# Patient Record
Sex: MALE | Race: WHITE | NOT HISPANIC OR LATINO | Employment: UNEMPLOYED | URBAN - METROPOLITAN AREA
[De-identification: names, ages, dates, MRNs, and addresses within clinical notes are randomized per-mention and may not be internally consistent; named-entity substitution may affect disease eponyms.]

---

## 2017-08-26 ENCOUNTER — HOSPITAL ENCOUNTER (EMERGENCY)
Facility: OTHER | Age: 22
Discharge: HOME | End: 2017-08-27
Attending: EMERGENCY MEDICINE

## 2017-08-26 DIAGNOSIS — L02.214 ABSCESS OF GROIN: Primary | ICD-10-CM

## 2017-08-26 PROCEDURE — 99283 EMERGENCY DEPT VISIT LOW MDM: CPT | Mod: 25

## 2017-08-26 PROCEDURE — 10060 I&D ABSCESS SIMPLE/SINGLE: CPT

## 2017-08-27 VITALS
HEIGHT: 66 IN | DIASTOLIC BLOOD PRESSURE: 57 MMHG | WEIGHT: 157.63 LBS | BODY MASS INDEX: 25.33 KG/M2 | SYSTOLIC BLOOD PRESSURE: 111 MMHG | HEART RATE: 84 BPM | TEMPERATURE: 99 F | OXYGEN SATURATION: 99 % | RESPIRATION RATE: 18 BRPM

## 2017-08-27 PROCEDURE — 87186 SC STD MICRODIL/AGAR DIL: CPT

## 2017-08-27 PROCEDURE — 87070 CULTURE OTHR SPECIMN AEROBIC: CPT

## 2017-08-27 PROCEDURE — 25000003 PHARM REV CODE 250: Performed by: EMERGENCY MEDICINE

## 2017-08-27 PROCEDURE — 87077 CULTURE AEROBIC IDENTIFY: CPT

## 2017-08-27 RX ORDER — HYDROCODONE BITARTRATE AND ACETAMINOPHEN 5; 325 MG/1; MG/1
1 TABLET ORAL
Status: COMPLETED | OUTPATIENT
Start: 2017-08-27 | End: 2017-08-27

## 2017-08-27 RX ORDER — LIDOCAINE HYDROCHLORIDE 10 MG/ML
5 INJECTION INFILTRATION; PERINEURAL
Status: DISCONTINUED | OUTPATIENT
Start: 2017-08-27 | End: 2017-08-27 | Stop reason: HOSPADM

## 2017-08-27 RX ORDER — ACETAMINOPHEN 500 MG
1000 TABLET ORAL
Status: COMPLETED | OUTPATIENT
Start: 2017-08-27 | End: 2017-08-27

## 2017-08-27 RX ORDER — IBUPROFEN 600 MG/1
600 TABLET ORAL EVERY 8 HOURS PRN
Qty: 15 TABLET | Refills: 0 | OUTPATIENT
Start: 2017-08-27 | End: 2019-07-05

## 2017-08-27 RX ORDER — ONDANSETRON 4 MG/1
4 TABLET, ORALLY DISINTEGRATING ORAL
Status: COMPLETED | OUTPATIENT
Start: 2017-08-27 | End: 2017-08-27

## 2017-08-27 RX ORDER — CLINDAMYCIN HYDROCHLORIDE 150 MG/1
300 CAPSULE ORAL
Status: COMPLETED | OUTPATIENT
Start: 2017-08-27 | End: 2017-08-27

## 2017-08-27 RX ORDER — SULFAMETHOXAZOLE AND TRIMETHOPRIM 800; 160 MG/1; MG/1
1 TABLET ORAL 2 TIMES DAILY
Qty: 14 TABLET | Refills: 0 | Status: SHIPPED | OUTPATIENT
Start: 2017-08-27 | End: 2017-09-03

## 2017-08-27 RX ADMIN — CLINDAMYCIN HYDROCHLORIDE 300 MG: 150 CAPSULE ORAL at 02:08

## 2017-08-27 RX ADMIN — ACETAMINOPHEN 1000 MG: 500 TABLET ORAL at 12:08

## 2017-08-27 RX ADMIN — ONDANSETRON 4 MG: 4 TABLET, ORALLY DISINTEGRATING ORAL at 12:08

## 2017-08-27 RX ADMIN — HYDROCODONE BITARTRATE AND ACETAMINOPHEN 1 TABLET: 5; 325 TABLET ORAL at 02:08

## 2017-08-29 LAB — BACTERIA SPEC AEROBE CULT: NORMAL

## 2017-08-29 NOTE — ED PROVIDER NOTES
Encounter Date: 8/26/2017       History     Chief Complaint   Patient presents with    Abscess     Patient states he has an abscess located in his pubic area.  He states it is red and swollen.  It appeared Friday.       Mr Knott states he picked an ingrown hair in his pubic region several days ago and it got red and swollen then suddenly got more swollen over the past day. He reports some mild chills but otherwise feels ok. Reports moderate pain in groin area of swelling and redness.  Denies history of same in the past.  He denies penile or scrotal pain or swelling.  He denies vomiting, diarrhea or constipation, or abdominal pain.  No home medications tried.      The history is provided by the patient.     Review of patient's allergies indicates:  No Known Allergies  History reviewed. No pertinent past medical history.  History reviewed. No pertinent surgical history.  History reviewed. No pertinent family history.  Social History   Substance Use Topics    Smoking status: Current Every Day Smoker    Smokeless tobacco: Never Used    Alcohol use Yes      Comment: occassionally     Review of Systems   Constitutional: Positive for chills.   Respiratory: Negative.    Cardiovascular: Negative.    Gastrointestinal: Negative.    Musculoskeletal: Negative.    Skin:        Positive redness, swelling, and pain to groin over pubic hair   All other systems reviewed and are negative.      Physical Exam     Initial Vitals [08/26/17 2346]   BP Pulse Resp Temp SpO2   133/82 (!) 129 20 (!) 100.6 °F (38.1 °C) 97 %      MAP       99         Physical Exam    Nursing note and vitals reviewed.  Constitutional: He appears well-developed and well-nourished. He is not diaphoretic. No distress.   HENT:   Head: Normocephalic and atraumatic.   Eyes: EOM are normal. Pupils are equal, round, and reactive to light.   Cardiovascular: Normal rate and regular rhythm.   No murmur heard.  Pulmonary/Chest: Breath sounds normal. No respiratory  "distress.   Musculoskeletal: Normal range of motion. He exhibits no edema or tenderness.   Neurological: He is alert and oriented to person, place, and time. No sensory deficit.   Skin: Skin is warm and dry. Capillary refill takes less than 2 seconds.   Palm-sized area of swelling, erythema, and tenderness to palpation over the right anterior groin region overlying pubic care..  Well-circumscribed area of clinical cellulitis with central very mild fluctuance and moderate surrounding induration.    Psychiatric: He has a normal mood and affect. His behavior is normal. Thought content normal.         ED Course   I & D - Incision and Drainage  Date/Time: 8/29/2017 12:21 PM  Location procedure was performed: [facility] EMERGENCY DEPARTMENT  Performed by: ENRIKE GRIDER  Authorized by: ENRIKE GRIDER   Consent Done: Yes  Consent: Verbal consent obtained. Written consent not obtained.  Risks and benefits: risks, benefits and alternatives were discussed  Consent given by: patient  Patient understanding: patient states understanding of the procedure being performed  Required items: required blood products, implants, devices, and special equipment available  Patient identity confirmed: verbally with patient  Time out: Immediately prior to procedure a "time out" was called to verify the correct patient, procedure, equipment, support staff and site/side marked as required.  Type: abscess  Anesthesia: local infiltration    Anesthesia:  Local Anesthetic: lidocaine 1% without epinephrine  Anesthetic total: 2 mL  Patient sedated: no  Description of findings: mild seropurulent material   Scalpel size: 10  Incision type: single straight  Complexity: simple  Drainage: serous and  pus  Drainage amount: scant  Wound treatment: wound packed (Irrigated with normal saline)  Packing material: 1/4 in iodoform gauze  Complications: No  Specimens: Yes  Patient tolerance: Patient tolerated the procedure well with no immediate " complications        Labs Reviewed   CULTURE, AEROBIC  (SPECIFY SOURCE)             Medical Decision Making:   ED Management:  Mr. Knott was counseled on home care and need to return for follow-up and recheck in 2 days.  He is stable for discharge.  We discussed worrisome signs that should prompt immediate return to the nurse [place].  There is no indication for further emergent intervention or evaluation this time.                   ED Course     Clinical Impression:   The encounter diagnosis was Abscess of groin.                           Cee Arciniega MD  08/29/17 6421

## 2019-07-05 ENCOUNTER — HOSPITAL ENCOUNTER (EMERGENCY)
Facility: HOSPITAL | Age: 24
Discharge: HOME | End: 2019-07-05
Attending: EMERGENCY MEDICINE

## 2019-07-05 VITALS
HEIGHT: 67 IN | WEIGHT: 165 LBS | HEART RATE: 83 BPM | TEMPERATURE: 99 F | SYSTOLIC BLOOD PRESSURE: 133 MMHG | BODY MASS INDEX: 25.9 KG/M2 | DIASTOLIC BLOOD PRESSURE: 64 MMHG | OXYGEN SATURATION: 98 %

## 2019-07-05 DIAGNOSIS — L24.9 IRRITANT CONTACT DERMATITIS, UNSPECIFIED TRIGGER: Primary | ICD-10-CM

## 2019-07-05 PROCEDURE — 99283 EMERGENCY DEPT VISIT LOW MDM: CPT | Mod: ER

## 2019-07-05 PROCEDURE — 25000003 PHARM REV CODE 250: Mod: ER | Performed by: NURSE PRACTITIONER

## 2019-07-05 RX ORDER — MUPIROCIN 20 MG/G
OINTMENT TOPICAL
Status: COMPLETED | OUTPATIENT
Start: 2019-07-05 | End: 2019-07-05

## 2019-07-05 RX ORDER — SULFAMETHOXAZOLE AND TRIMETHOPRIM 800; 160 MG/1; MG/1
1 TABLET ORAL 2 TIMES DAILY
Qty: 14 TABLET | Refills: 0 | Status: SHIPPED | OUTPATIENT
Start: 2019-07-05 | End: 2019-07-12

## 2019-07-05 RX ADMIN — MUPIROCIN: 20 OINTMENT TOPICAL at 06:07

## 2019-10-27 PROCEDURE — 99284 EMERGENCY DEPT VISIT MOD MDM: CPT

## 2019-10-28 ENCOUNTER — HOSPITAL ENCOUNTER (EMERGENCY)
Facility: HOSPITAL | Age: 24
Discharge: HOME | End: 2019-10-28
Attending: EMERGENCY MEDICINE

## 2019-10-28 VITALS
HEART RATE: 75 BPM | TEMPERATURE: 98 F | RESPIRATION RATE: 16 BRPM | DIASTOLIC BLOOD PRESSURE: 61 MMHG | HEIGHT: 66 IN | WEIGHT: 160 LBS | BODY MASS INDEX: 25.71 KG/M2 | SYSTOLIC BLOOD PRESSURE: 111 MMHG | OXYGEN SATURATION: 95 %

## 2019-10-28 DIAGNOSIS — L03.90 CELLULITIS, UNSPECIFIED CELLULITIS SITE: Primary | ICD-10-CM

## 2019-10-28 RX ORDER — MUPIROCIN 20 MG/G
OINTMENT TOPICAL 2 TIMES DAILY
Qty: 15 G | Refills: 0 | Status: SHIPPED | OUTPATIENT
Start: 2019-10-28

## 2019-10-28 RX ORDER — SULFAMETHOXAZOLE AND TRIMETHOPRIM 800; 160 MG/1; MG/1
2 TABLET ORAL 2 TIMES DAILY
Qty: 28 TABLET | Refills: 0 | Status: SHIPPED | OUTPATIENT
Start: 2019-10-28 | End: 2019-11-04

## 2019-10-28 RX ORDER — PANTOPRAZOLE SODIUM 20 MG/1
20 TABLET, DELAYED RELEASE ORAL DAILY
Qty: 14 TABLET | Refills: 0 | Status: SHIPPED | OUTPATIENT
Start: 2019-10-28 | End: 2019-11-11

## 2019-10-28 NOTE — ED TRIAGE NOTES
Pt presents via personal transport for evaluation of possible insect bites. He reports one on his chest and one on his back.

## 2019-10-28 NOTE — DISCHARGE INSTRUCTIONS
Take all antibiotics as prescribed.  Try to take with meals to limit nausea.  Protonix daily to help with heartburn.  Continue with warm compresses.    Follow-up with and establish care with a primary care provider for re-evaluation.  Please return to this emergency department if symptoms persist or worsen, if you begin with fever, if swelling and redness worsen, if any other problems occur.

## 2019-10-28 NOTE — ED PROVIDER NOTES
Encounter Date: 10/27/2019       History     Chief Complaint   Patient presents with    Insect Bite     Patient reports bug bite on chest and back. Patient states bite on chest started off as a mosquito bite. The bite on his back started off as a pimple. Patient states over the last few days the sites are painful to touch. No discharge noted at sites.      25yo M with no significant pmh on file with chief complaint possible insect bite. Pt states 2-3 day hx pimple-like mass to chest and low back. Denies hx insect bite. Denies fever. No hx frequent skin infections. Pt states areas are exquisitely ttp. No drainage. No IV drug use. No chills. No night sweats or unintentional weight loss. No trauma. No hx immunosuppression. Symptoms acute, constant, severity 6/10. No alleviating factors.         Review of patient's allergies indicates:  No Known Allergies  No past medical history on file.  No past surgical history on file.  History reviewed. No pertinent family history.  Social History     Tobacco Use    Smoking status: Current Every Day Smoker    Smokeless tobacco: Never Used   Substance Use Topics    Alcohol use: Yes     Comment: occassionally    Drug use: Yes     Types: Marijuana     Comment: weekly     Review of Systems   Constitutional: Negative for chills and fever.   HENT: Negative for congestion, rhinorrhea and sore throat.    Respiratory: Negative for shortness of breath.    Cardiovascular: Negative for chest pain.   Gastrointestinal: Negative for abdominal pain, nausea and vomiting.   Genitourinary: Negative for dysuria.   Musculoskeletal: Negative for back pain, myalgias, neck pain and neck stiffness.   Skin: Positive for wound. Negative for rash.   Neurological: Negative for weakness.   Hematological: Does not bruise/bleed easily.   All other systems reviewed and are negative.      Physical Exam     Initial Vitals [10/27/19 2221]   BP Pulse Resp Temp SpO2   123/65 97 17 98.3 °F (36.8 °C) 98 %      MAP        --         Physical Exam    Nursing note and vitals reviewed.  Constitutional: He appears well-developed and well-nourished. He is not diaphoretic. No distress.   HENT:   Head: Normocephalic and atraumatic.   Eyes: Conjunctivae and EOM are normal. Pupils are equal, round, and reactive to light.   Neck: Normal range of motion. Neck supple.   Cardiovascular: Intact distal pulses.   Pulmonary/Chest: No respiratory distress.   Abdominal: There is no tenderness.   Musculoskeletal: Normal range of motion. He exhibits no tenderness.   Neurological: He is alert and oriented to person, place, and time. He has normal strength.   Skin: Skin is warm and dry. Capillary refill takes less than 2 seconds. No rash and no abscess noted. No erythema.   Indurated mass to abdomen, just below xyphoid; exquisitely ttp; similar mass to R low back   Psychiatric: He has a normal mood and affect. His behavior is normal. Judgment and thought content normal.         ED Course   Procedures  Labs Reviewed - No data to display       Imaging Results    None          Medical Decision Making:   Differential Diagnosis:   Abscess, infected wound, cellulitis  ED Management:  Will trial warm compresses, topical and systemic abx. No significant comorbidities. No fever. Vitals reassuring. Return precautions given.                       Clinical Impression:       ICD-10-CM ICD-9-CM   1. Cellulitis, unspecified cellulitis site L03.90 682.9         Disposition:   Disposition: Discharged  Condition: Stable                        Rudolph Longoria PA-C  10/28/19 0414

## 2020-10-27 NOTE — ED PROVIDER NOTES
Encounter Date: 7/5/2019       History     Chief Complaint   Patient presents with    Rash     rash on lower abdoman has been there for over a month.    draining fluid.      23 y/o male presents to the ED with complaints of rash to lower abdomen for 1 month.  He denies fever, abdominal pain, nausea, vomiting, diarrhea, numbness, weakness, tingling, CP, SOB, new soaps/lotions/detergents, URI symptoms, or any other complaints.  He denies pain and states the rash is itching and burning.  He has not tried anything for his symptoms.        Review of patient's allergies indicates:  No Known Allergies  History reviewed. No pertinent past medical history.  History reviewed. No pertinent surgical history.  No family history on file.  Social History     Tobacco Use    Smoking status: Current Every Day Smoker    Smokeless tobacco: Never Used   Substance Use Topics    Alcohol use: Yes     Comment: occassionally    Drug use: Yes     Types: Marijuana     Comment: weekly     Review of Systems   Constitutional: Negative for chills and fever.   HENT: Negative for congestion, ear pain, rhinorrhea and sore throat.    Eyes: Negative for pain, discharge and redness.   Respiratory: Negative for cough and shortness of breath.    Cardiovascular: Negative for chest pain.   Gastrointestinal: Negative for abdominal pain, diarrhea, nausea and vomiting.   Genitourinary: Negative for dysuria.   Musculoskeletal: Negative for back pain, neck pain and neck stiffness.   Skin: Positive for rash.   Neurological: Negative for dizziness, weakness, light-headedness, numbness and headaches.   Psychiatric/Behavioral: Negative for confusion.       Physical Exam     Initial Vitals   BP Pulse Resp Temp SpO2   07/05/19 1737 07/05/19 1737 -- 07/05/19 1735 07/05/19 1735   133/64 83  99.2 °F (37.3 °C) 98 %      MAP       --                Physical Exam    Nursing note and vitals reviewed.  Constitutional: Vital signs are normal. He appears well-developed. He  is cooperative.  Non-toxic appearance. He does not appear ill.   HENT:   Head: Normocephalic and atraumatic.   Eyes: Conjunctivae are normal.   Neck: Normal range of motion.   Cardiovascular: Normal rate and regular rhythm.   Pulmonary/Chest: Effort normal and breath sounds normal. No respiratory distress.   Abdominal: Soft. Normal appearance and bowel sounds are normal. He exhibits no distension.   Neurological: He is alert and oriented to person, place, and time. GCS eye subscore is 4. GCS verbal subscore is 5. GCS motor subscore is 6.   Skin: Skin is warm, dry and intact. Capillary refill takes less than 2 seconds. Rash noted. Rash is maculopapular.   Maculopapular rash to lower abdomen c/w contact dermatitis with overlying scabbing and overlying infection; no induration or fluctuance   Psychiatric: He has a normal mood and affect. His speech is normal and behavior is normal. Thought content normal.         ED Course   Procedures  Labs Reviewed - No data to display       Imaging Results    None                APC / Resident Notes:   This is an evaluation of a 24 y.o. male that presents to the Emergency Department for rash    Physical Exam shows a non-toxic, afebrile, and well appearing male. Maculopapular rash to lower abdomen c/w contact dermatitis with overlying scabbing and overlying infection; no induration or fluctuance             Attending Attestation:     Physician Attestation Statement for NP/PA:   I have conducted a face to face encounter with this patient in addition to the NP/PA, due to Medical Complexity    Other NP/PA Attestation Additions:    History of Present Illness: 25 y/o male  rash to lower abdomen for 1 month.  He denies fever, abdominal pain, nausea, vomiting, diarrhea, numbness, weakness, tingling, CP, SOB, new soaps/lotions/detergents, URI symptoms, or any other complaints.   Physical Exam: Awake alert oriented ×4 speaking clearly in full sentences.   No acute distress.  Regular rate and  rhythm no murmur gallop or rub.  Clear to auscultation bilateral without wheeze crackles or Rales  Abdomen soft, nontender, nondistended.  Bowel sounds ×4.  Pulses palpable ×4 no clubbing cyanosis or edema.    SkinSkin: Skin is warm, dry and intact. Capillary refill takes less than 2 seconds. Rash noted. Rash is maculopapular.   Maculopapular rash to lower abdomen c/w contact dermatitis with overlying scabbing and overlying infection; no induration or fluctuance      Medical Decision Making: Physical Exam shows a non-toxic, afebrile, and well appearing male. Maculopapular rash to lower abdomen c/w contact dermatitis with overlying scabbing and overlying infection; no induration or fluctuance    Vital signs are reassuring.    My overall impression is contact dermatitis. I considered, but at this time, do not suspect cellulitis, abscess, SJS, TEN, allergic reaction, hives.    ED Course: cleaned and dressed with bactroban. D/C Meds: bactrim, bactroban tube given. D/C Information: medication, f/u. The diagnosis, treatment plan, instructions for follow-up and reevaluation with PCP, clinic as well as ED return precautions were discussed and understanding was verbalized. All questions or concerns have been addressed.     This case was discussed with and the patient has been examined by Dr. Adkins who is in agreement with my assessment and plan.                     Clinical Impression:       ICD-10-CM ICD-9-CM   1. Irritant contact dermatitis, unspecified trigger L24.9 692.9                                Donna Acevedo, MAIN  07/06/19 0010       Tanvi Adkins, DO  07/07/19 1448       Tanvi Adkins,   07/07/19 1448     POST-OP DIAGNOSIS:  Meniscal injury 27-Oct-2020 15:29:52  Boaz Sanchez   Yes

## 2023-10-29 ENCOUNTER — HOSPITAL ENCOUNTER (EMERGENCY)
Facility: HOSPITAL | Age: 28
Discharge: HOME | End: 2023-10-29
Attending: EMERGENCY MEDICINE

## 2023-10-29 VITALS
SYSTOLIC BLOOD PRESSURE: 152 MMHG | DIASTOLIC BLOOD PRESSURE: 86 MMHG | BODY MASS INDEX: 27.32 KG/M2 | TEMPERATURE: 99 F | WEIGHT: 170 LBS | OXYGEN SATURATION: 98 % | RESPIRATION RATE: 18 BRPM | HEART RATE: 94 BPM | HEIGHT: 66 IN

## 2023-10-29 DIAGNOSIS — H60.311 ACUTE DIFFUSE OTITIS EXTERNA OF RIGHT EAR: Primary | ICD-10-CM

## 2023-10-29 PROCEDURE — 25000003 PHARM REV CODE 250: Mod: ER | Performed by: EMERGENCY MEDICINE

## 2023-10-29 PROCEDURE — 99284 EMERGENCY DEPT VISIT MOD MDM: CPT | Mod: ER

## 2023-10-29 RX ORDER — IBUPROFEN 600 MG/1
600 TABLET ORAL EVERY 6 HOURS PRN
Qty: 20 TABLET | Refills: 0 | Status: SHIPPED | OUTPATIENT
Start: 2023-10-29

## 2023-10-29 RX ORDER — IBUPROFEN 400 MG/1
800 TABLET ORAL
Status: COMPLETED | OUTPATIENT
Start: 2023-10-29 | End: 2023-10-29

## 2023-10-29 RX ORDER — CIPROFLOXACIN AND DEXAMETHASONE 3; 1 MG/ML; MG/ML
4 SUSPENSION/ DROPS AURICULAR (OTIC) 2 TIMES DAILY
Qty: 7.5 ML | Refills: 0 | Status: SHIPPED | OUTPATIENT
Start: 2023-10-29 | End: 2023-11-05

## 2023-10-29 RX ORDER — HYDROCODONE BITARTRATE AND ACETAMINOPHEN 7.5; 325 MG/1; MG/1
1 TABLET ORAL EVERY 12 HOURS PRN
Qty: 6 TABLET | Refills: 0 | Status: SHIPPED | OUTPATIENT
Start: 2023-10-29 | End: 2023-11-01

## 2023-10-29 RX ORDER — AMOXICILLIN AND CLAVULANATE POTASSIUM 875; 125 MG/1; MG/1
1 TABLET, FILM COATED ORAL 2 TIMES DAILY
Qty: 14 TABLET | Refills: 0 | Status: SHIPPED | OUTPATIENT
Start: 2023-10-29 | End: 2023-11-05

## 2023-10-29 RX ADMIN — IBUPROFEN 800 MG: 400 TABLET ORAL at 07:10

## 2023-10-30 NOTE — ED PROVIDER NOTES
Encounter Date: 10/29/2023       History     Chief Complaint   Patient presents with    Otalgia     Right ear pain worsening x1 week; denies any other symptoms; Afebrile.      Patient presents to the ED for evaluation of right ear pain for 1 week.  He states he had been taking ibuprofen for the pain with no relief.  He denies fever but endorses drainage from his ear.        Review of patient's allergies indicates:  No Known Allergies  No past medical history on file.  No past surgical history on file.  No family history on file.  Social History     Tobacco Use    Smoking status: Every Day    Smokeless tobacco: Never   Substance Use Topics    Alcohol use: Yes     Comment: occassionally    Drug use: Yes     Types: Marijuana     Comment: weekly     Review of Systems   Constitutional:  Negative for fever.   HENT:  Positive for congestion, ear discharge, ear pain and hearing loss. Negative for facial swelling, sinus pressure, sore throat and trouble swallowing.    Respiratory:  Negative for cough and shortness of breath.    Gastrointestinal:  Negative for abdominal pain, nausea and vomiting.   All other systems reviewed and are negative.      Physical Exam     Initial Vitals [10/29/23 1759]   BP Pulse Resp Temp SpO2   (!) 152/86 94 18 98.8 °F (37.1 °C) 98 %      MAP       --         Physical Exam    Nursing note and vitals reviewed.  HENT:   Head: Normocephalic and atraumatic.   Right Ear: There is drainage, swelling and tenderness. No mastoid tenderness. Decreased hearing is noted.   Left Ear: Hearing, external ear and ear canal normal.   Eyes: Conjunctivae and EOM are normal. Right eye exhibits no discharge. Left eye exhibits no discharge.   Neck: Neck supple.   Normal range of motion.  Musculoskeletal:      Cervical back: Normal range of motion and neck supple.           ED Course   Procedures  Labs Reviewed - No data to display       Imaging Results    None          Medications   ibuprofen tablet 800 mg (800 mg Oral  Given 10/29/23 1921)     Medical Decision Making  DDx includes but is not limited to:  Otitis media, otitis externa, Eustachian tube dysfunction    28-year-old patient with right ear pain that is worsened over 1 week.  Unable to visualize TM at this time due to swelling however there is no mastoid tenderness.  Ciprodex and Augmentin prescribed.  Strict return precautions discussed.  All questions asked by the patient were answered.  The patient verbalized understanding of and agreement with the plan.    Risk  Prescription drug management.                               Clinical Impression:   Final diagnoses:  [H60.311] Acute diffuse otitis externa of right ear (Primary)        ED Disposition Condition    Discharge Stable          ED Prescriptions       Medication Sig Dispense Start Date End Date Auth. Provider    amoxicillin-clavulanate 875-125mg (AUGMENTIN) 875-125 mg per tablet Take 1 tablet by mouth 2 (two) times daily. for 7 days 14 tablet 10/29/2023 11/5/2023 Evelin Calles DNP, NP    ciprofloxacin-dexAMETHasone 0.3-0.1% (CIPRODEX) 0.3-0.1 % DrpS Place 4 drops into the right ear 2 (two) times daily. for 7 days 7.5 mL 10/29/2023 11/5/2023 Evelin Calles DNP, NP    HYDROcodone-acetaminophen (NORCO) 7.5-325 mg per tablet Take 1 tablet by mouth every 12 (twelve) hours as needed for Pain. 6 tablet 10/29/2023 11/1/2023 Evelin Calles DNP, NP    ibuprofen (ADVIL,MOTRIN) 600 MG tablet Take 1 tablet (600 mg total) by mouth every 6 (six) hours as needed for Pain (take with food). 20 tablet 10/29/2023 -- Evelin Calles DNP, NP          Follow-up Information       Follow up With Specialties Details Why Contact Info    [facility]  Schedule an appointment as soon as possible for a visit in 1 week ED follow up, establish primary care [address]  [facility] [phone]        This medical record was prepared using voice recognition software and may contain phonetic  and/or or grammatical errors.  Garbled syntax, mangled pronounciations, and other bizarre constructions may be attributed to that system.       Evelin Calles, A[place], NP  10/30/23 0016